# Patient Record
Sex: FEMALE
[De-identification: names, ages, dates, MRNs, and addresses within clinical notes are randomized per-mention and may not be internally consistent; named-entity substitution may affect disease eponyms.]

---

## 2017-01-05 ENCOUNTER — RX ONLY (OUTPATIENT)
Age: 62
Setting detail: RX ONLY
End: 2017-01-05

## 2018-02-08 ENCOUNTER — APPOINTMENT (RX ONLY)
Dept: URBAN - NONMETROPOLITAN AREA CLINIC 13 | Facility: CLINIC | Age: 63
Setting detail: DERMATOLOGY
End: 2018-02-08

## 2018-02-08 DIAGNOSIS — Z41.9 ENCOUNTER FOR PROCEDURE FOR PURPOSES OTHER THAN REMEDYING HEALTH STATE, UNSPECIFIED: ICD-10-CM

## 2018-02-08 PROCEDURE — ? JUVEDERM VOLUMA XC INJECTION

## 2018-02-08 PROCEDURE — ? BELOTERO INJECTION

## 2018-02-08 NOTE — PROCEDURE: BELOTERO INJECTION
Decollete Filler Volume In Cc: 0
Map Statement: See Attach Map for Complete Details
Price (Use Numbers Only, No Special Characters Or $): 900
Anesthesia Type: 0.2% lidocaine (mixed within filler)
Additional Area 1 Location: lip
Additional Anesthesia Volume In Cc: 6
Detail Level: Detailed
Additional Area 1 Volume In Cc: 1
Additional Area 2 Location: [facility]
Use Map Statement For Sites (Optional): No
Filler: Belotero
Lot #: [id number]
Expiration Date (Month Year): 11/18
Post-Care Instructions: Patient instructed to apply ice to reduce swelling.
Consent: Written consent obtained. Risks include but not limited to bruising, beading, irregular texture, ulceration, infection, allergic reaction, scar formation, incomplete augmentation, temporary nature, procedural pain.
Procedural Text: The filler was administered to the treatment areas noted above.

## 2018-02-08 NOTE — PROCEDURE: JUVEDERM VOLUMA XC INJECTION
Nasolabial Folds Filler Volume In Cc: 0
Consent: Written consent obtained. Risks include but not limited to bruising, beading, irregular texture, ulceration, infection, allergic reaction, scar formation, incomplete augmentation, temporary nature, procedural pain.
Anesthesia Volume In Cc: 0.5
Lot #: [id number]
Topical Anesthesia?: 23% lidocaine, 7% tetracaine
Use Map Statement For Sites (Optional): No
Additional Anesthesia Volume In Cc: 6
Expiration Date (Month Year): 4/23
Filler: Juvederm Voluma XC
Procedural Text: The filler was administered to the treatment areas noted above. Injected into cheek area.
Detail Level: Detailed
Cheeks Filler Volume In Cc: 2
Map Statment: See Attach Map for Complete Details
Post-Care Instructions: Patient instructed to apply ice to reduce swelling.
Price (Use Numbers Only, No Special Characters Or $): 1800

## 2018-02-16 ENCOUNTER — APPOINTMENT (RX ONLY)
Dept: URBAN - NONMETROPOLITAN AREA CLINIC 13 | Facility: CLINIC | Age: 63
Setting detail: DERMATOLOGY
End: 2018-02-16

## 2018-02-16 DIAGNOSIS — D18.0 HEMANGIOMA: ICD-10-CM

## 2018-02-16 DIAGNOSIS — L70.8 OTHER ACNE: ICD-10-CM

## 2018-02-16 DIAGNOSIS — I78.8 OTHER DISEASES OF CAPILLARIES: ICD-10-CM

## 2018-02-16 DIAGNOSIS — L56.4 POLYMORPHOUS LIGHT ERUPTION: ICD-10-CM | Status: RESOLVED

## 2018-02-16 DIAGNOSIS — L57.8 OTHER SKIN CHANGES DUE TO CHRONIC EXPOSURE TO NONIONIZING RADIATION: ICD-10-CM

## 2018-02-16 DIAGNOSIS — D22 MELANOCYTIC NEVI: ICD-10-CM

## 2018-02-16 PROBLEM — D22.5 MELANOCYTIC NEVI OF TRUNK: Status: ACTIVE | Noted: 2018-02-16

## 2018-02-16 PROBLEM — D18.01 HEMANGIOMA OF SKIN AND SUBCUTANEOUS TISSUE: Status: ACTIVE | Noted: 2018-02-16

## 2018-02-16 PROCEDURE — 99213 OFFICE O/P EST LOW 20 MIN: CPT

## 2018-02-16 PROCEDURE — ? SUNSCREEN RECOMMENDATIONS

## 2018-02-16 PROCEDURE — ? COUNSELING

## 2018-02-16 PROCEDURE — ? PRESCRIPTION

## 2018-02-16 RX ORDER — TRETINOIN 1 MG/G
CREAM TOPICAL
Qty: 1 | Refills: 3 | Status: ERX | COMMUNITY
Start: 2018-02-16

## 2018-02-16 RX ORDER — CLINDAMYCIN PHOSPHATE 10 MG/G
GEL TOPICAL
Qty: 1 | Refills: 3 | Status: ERX | COMMUNITY
Start: 2018-02-16

## 2018-02-16 RX ORDER — DAPSONE 75 MG/G
GEL TOPICAL
Qty: 1 | Refills: 3 | Status: ERX | COMMUNITY
Start: 2018-02-16

## 2018-02-16 RX ADMIN — DAPSONE: 75 GEL TOPICAL at 19:08

## 2018-02-16 RX ADMIN — CLINDAMYCIN PHOSPHATE: 10 GEL TOPICAL at 19:09

## 2018-02-16 RX ADMIN — TRETINOIN: 1 CREAM TOPICAL at 19:07

## 2018-02-16 ASSESSMENT — LOCATION SIMPLE DESCRIPTION DERM
LOCATION SIMPLE: RIGHT BACK
LOCATION SIMPLE: SUPERIOR FOREHEAD
LOCATION SIMPLE: CHEST
LOCATION SIMPLE: RIGHT UPPER BACK
LOCATION SIMPLE: LEFT NOSE

## 2018-02-16 ASSESSMENT — LOCATION ZONE DERM
LOCATION ZONE: FACE
LOCATION ZONE: TRUNK
LOCATION ZONE: NOSE

## 2018-02-16 ASSESSMENT — LOCATION DETAILED DESCRIPTION DERM
LOCATION DETAILED: LEFT NASAL SIDEWALL
LOCATION DETAILED: UPPER STERNUM
LOCATION DETAILED: RIGHT MID-UPPER BACK
LOCATION DETAILED: RIGHT SUPERIOR LATERAL UPPER BACK
LOCATION DETAILED: SUPERIOR MID FOREHEAD

## 2018-02-22 ENCOUNTER — RX ONLY (OUTPATIENT)
Age: 63
Setting detail: RX ONLY
End: 2018-02-22

## 2018-02-22 RX ORDER — CLINDAMYCIN PHOS/BENZOYL PEROX 1 %-5 %
GEL (GRAM) TOPICAL
Qty: 1 | Refills: 3 | Status: ERX | COMMUNITY
Start: 2018-02-22

## 2019-01-24 ENCOUNTER — RX ONLY (OUTPATIENT)
Age: 64
Setting detail: RX ONLY
End: 2019-01-24

## 2019-01-24 RX ORDER — DAPSONE 75 MG/G
GEL TOPICAL
Qty: 1 | Refills: 3 | Status: ERX

## 2020-01-09 ENCOUNTER — RX ONLY (OUTPATIENT)
Age: 65
Setting detail: RX ONLY
End: 2020-01-09

## 2020-01-09 RX ORDER — CLINDAMYCIN PHOSPHATE AND BENZOYL PEROXIDE 10; 50 MG/G; MG/G
GEL TOPICAL
Qty: 1 | Refills: 0 | Status: ERX | COMMUNITY
Start: 2020-01-09

## 2020-02-27 ENCOUNTER — APPOINTMENT (RX ONLY)
Dept: URBAN - METROPOLITAN AREA CLINIC 1 | Facility: CLINIC | Age: 65
Setting detail: DERMATOLOGY
End: 2020-02-27

## 2020-02-27 DIAGNOSIS — Z41.9 ENCOUNTER FOR PROCEDURE FOR PURPOSES OTHER THAN REMEDYING HEALTH STATE, UNSPECIFIED: ICD-10-CM

## 2020-02-27 PROCEDURE — ? JUVEDERM VOLBELLA INJECTION

## 2020-02-27 PROCEDURE — ? BOTOX

## 2020-02-27 NOTE — PROCEDURE: JUVEDERM VOLBELLA INJECTION
Decollete Filler Volume In Cc: 0
Include Cannula Information In Note?: No
Consent: Written consent obtained. Risks include but not limited to bruising, beading, irregular texture, ulceration, infection, allergic reaction, scar formation, incomplete augmentation, temporary nature, procedural pain.
Additional Area 4 Location: Vermillion border, lower lip and perioral rhytides
Additional Area 2 Location: Scar on L side of face
Lot #: [id number]
Expiration Date (Month Year): 9/20
Anesthesia Type: 1% lidocaine with epinephrine
Filler: Juvederm Volbella XC
Post-Care Instructions: Patient instructed to apply ice to reduce swelling.
Map Statment: See Attach Map for Complete Details
Price (Use Numbers Only, No Special Characters Or $): 737
Additional Anesthesia Volume In Cc: 6
Vermilion Lips Filler Volume In Cc: 0.8
Detail Level: Detailed
Additional Area 1 Location: perioral rhytides
Additional Area 3 Location: corners of mouth
Additional Area 5 Location: touch up to lip filler
Procedural Text: The filler was administered to the treatment areas noted above.
Anesthesia Volume In Cc: 0.5
Additional Area 3 Volume In Cc: 0.3
Number Of Syringes (Required For Inventory): 1

## 2020-02-27 NOTE — PROCEDURE: BOTOX
Show Additional Area 5: Yes
Periorbital Skin Units: 0
Post-Care Instructions: Patient instructed to not lie down for 4 hours and limit physical activity for 24 hours. Patient instructed not to travel by airplane for 48 hours.
Show Right And Left Pupillary Line Units: No
Lot #: ,
Detail Level: Zone
Expiration Date (Month Year): 8/22, 7/22
Additional Area 1 Units: 20
Additional Area 5 Location: L sergey
Dilution (U/0.1 Cc): 1
Price (Use Numbers Only, No Special Characters Or $): 497
Additional Area 4 Location: sergey [facility]
Additional Area 2 Location: masseter, L and R
Additional Area 1 Location: [facility].
Additional Area 2 Units: 30
Additional Area 6 Location: platysma
Consent: Written consent obtained. Risks include but not limited to lid/brow ptosis, bruising, swelling, diplopia, temporary effect, incomplete chemical denervation.
Additional Area 3 Location: R high forehead

## 2020-03-11 ENCOUNTER — APPOINTMENT (RX ONLY)
Dept: URBAN - METROPOLITAN AREA CLINIC 1 | Facility: CLINIC | Age: 65
Setting detail: DERMATOLOGY
End: 2020-03-11

## 2020-03-11 DIAGNOSIS — Z41.9 ENCOUNTER FOR PROCEDURE FOR PURPOSES OTHER THAN REMEDYING HEALTH STATE, UNSPECIFIED: ICD-10-CM

## 2020-03-11 PROCEDURE — ? SCULPTRA

## 2020-03-11 NOTE — PROCEDURE: SCULPTRA
Left Middle Malar Filler Volume In Cc: 0
Treatment Number: 1
Show First Additional Location?: Yes
Additional Area 1 Location: melolabial fold
Lateral Face Sculptra Filler Volume In Cc: 18
Post-Care Instructions: Patient instructed to massage area vigorously fir 5 minutes 5 times a day for 5 days. Also instructed if she has any concerns.
Show Right And Left Buccal Volume?: No
Cheeks Filler Volume In Cc: 8
Lot #: [id number]
Temple Hollows Filler Volume In Cc: 10
Detail Level: Detailed
Jawline Sculptra Filler Volume In Cc: 2
Expiration Date (Month Year): 10/22
Anesthesia Type: 1% lidocaine with 1:100,000 epinephrine
Price (Use Numbers Only, No Special Characters Or $): 1600
Additional Area 2 Location: midface rhytides
Dilution Method: The Sculptra was diluted with 10ml Bacteriostatic water and 9cc NS,1cc Lidocaine.
Additional Area 3 Location: jawline and lower face
Injection Technique: The Sculptra was injected with cannula to the listed areas after cleansing the skin with Chloroxylenol and Hypochlorus.
Volumizer: Sculptra
Consent: Written consent obtained. Risks include but not limited to bruising, beading, irregular texture, ulceration, infection, allergic reaction, scar formation, incomplete augmentation, temporary nature, procedural pain.
Additional Anesthesia Type: 1% lidocaine without epinephrine
Anesthesia Volume In Cc: 0.2
Map Statement: See Attached Map for Complete Details.

## 2020-03-16 ENCOUNTER — RX ONLY (OUTPATIENT)
Age: 65
Setting detail: RX ONLY
End: 2020-03-16

## 2020-03-16 RX ORDER — BIMATOPROST 0.3 MG/ML
SOLUTION/ DROPS OPHTHALMIC
Qty: 1 | Refills: 12 | COMMUNITY
Start: 2020-03-16

## 2020-06-09 ENCOUNTER — APPOINTMENT (RX ONLY)
Dept: URBAN - NONMETROPOLITAN AREA CLINIC 13 | Facility: CLINIC | Age: 65
Setting detail: DERMATOLOGY
End: 2020-06-09

## 2020-06-09 DIAGNOSIS — Z41.9 ENCOUNTER FOR PROCEDURE FOR PURPOSES OTHER THAN REMEDYING HEALTH STATE, UNSPECIFIED: ICD-10-CM

## 2020-06-09 PROCEDURE — ? SCULPTRA

## 2020-06-09 NOTE — PROCEDURE: SCULPTRA
Chin Filler Volume In Cc: 0
Cheeks Filler Volume In Cc: 6
Jawline Sculptra Filler Volume In Cc: 4
Anesthesia Volume In Cc: 0.2
Post-Care Instructions: Patient instructed to massage area vigorously fir 5 minutes 5 times a day for 5 days. Also instructed if she has any concerns.
Show Third Additional Location?: Yes
Price (Use Numbers Only, No Special Characters Or $): 1600
Show Right And Left Nasolabial Fold Volume?: No
Additional Area 2 Location: midface rhytides
Dilution Method: The Sculptra was diluted with 10ml Bacteriostatic water and 9cc NS,1cc Lidocaine.
Additional Area 3 Location: jawline and lower face
Additional Anesthesia Type: 1% lidocaine without epinephrine
Volumizer: Sculptra
Injection Technique: The Sculptra was injected with cannula to the listed areas after cleansing the skin with Chloroxylenol and Hypochlorus.
Consent: Written consent obtained. Risks include but not limited to bruising, beading, irregular texture, ulceration, infection, allergic reaction, scar formation, incomplete augmentation, temporary nature, procedural pain.
Lot #: [id number]
Additional Anesthesia Volume In Cc: 1
Map Statement: See Attached Map for Complete Details.
Temple Hollows Filler Volume In Cc: 10
Additional Area 1 Location: melolabial fold
Anesthesia Type: 1% lidocaine with 1:100,000 epinephrine
Vials Reconstituted (Required For Inventory): 2
Additional Area 4 Location: lower face,chin area
Detail Level: Detailed
Expiration Date (Month Year): 11/22

## 2020-10-21 ENCOUNTER — APPOINTMENT (RX ONLY)
Dept: URBAN - NONMETROPOLITAN AREA CLINIC 13 | Facility: CLINIC | Age: 65
Setting detail: DERMATOLOGY
End: 2020-10-21

## 2020-10-21 DIAGNOSIS — L82.0 INFLAMED SEBORRHEIC KERATOSIS: ICD-10-CM

## 2020-10-21 PROBLEM — D48.5 NEOPLASM OF UNCERTAIN BEHAVIOR OF SKIN: Status: ACTIVE | Noted: 2020-10-21

## 2020-10-21 PROCEDURE — ? COUNSELING

## 2020-10-21 PROCEDURE — 99212 OFFICE O/P EST SF 10 MIN: CPT

## 2020-10-21 ASSESSMENT — LOCATION ZONE DERM: LOCATION ZONE: TRUNK

## 2020-10-21 ASSESSMENT — LOCATION SIMPLE DESCRIPTION DERM: LOCATION SIMPLE: CHEST

## 2020-10-21 ASSESSMENT — LOCATION DETAILED DESCRIPTION DERM: LOCATION DETAILED: UPPER STERNUM

## 2021-11-09 ENCOUNTER — APPOINTMENT (RX ONLY)
Dept: URBAN - NONMETROPOLITAN AREA CLINIC 13 | Facility: CLINIC | Age: 66
Setting detail: DERMATOLOGY
End: 2021-11-09

## 2021-11-09 DIAGNOSIS — Z41.9 ENCOUNTER FOR PROCEDURE FOR PURPOSES OTHER THAN REMEDYING HEALTH STATE, UNSPECIFIED: ICD-10-CM

## 2021-11-09 PROCEDURE — ? BOTOX

## 2021-11-09 PROCEDURE — ? JUVEDERM VOLUMA XC INJECTION

## 2021-11-09 PROCEDURE — ? RESTYLANE REFYNE INJECTION

## 2021-11-09 PROCEDURE — ? JUVEDERM ULTRA INJECTION

## 2021-11-09 PROCEDURE — ? SCITON BBL SKINTYTE

## 2021-11-09 NOTE — PROCEDURE: SCITON BBL SKINTYTE
Temp In C (Optional): 20
Time In Seconds (Optional): 15
Shots (Optional): 2 passes
Post-Care Instructions: I reviewed with the patient in detail post-care instructions. Patient should stay away from the sun and wear sun protection until treated areas are fully healed.
Preprocedure Text: The treatment areas were thoroughly cleaned.
Intesity In W/Cm2 (Optional): 10
Expected Treatment Number: 0
Price (Use Numbers Only, No Special Characters Or $): 500
Intesity In W/Cm2 (Optional): 12
Post Procedure Text: The patient tolerated the procedure well. Post care was reviewed with the patient.
Shots (Optional): 4 passes
Treatment Number: 1
Consent: Written consent obtained, risks reviewed including but not limited to crusting, scabbing, blistering, scarring, darker or lighter pigmentary change, bruising, and/or incomplete response.
Location Override (Will Not Show Above If Text Entered): lower face,neck
Intesity In W/Cm2 (Optional): 8-9
Shots (Optional): 3passes
Location Override (Will Not Show Above If Text Entered): face,neck
Detail Level: Zone
Intesity In W/Cm2 (Optional): 9-10

## 2021-11-09 NOTE — PROCEDURE: RESTYLANE REFYNE INJECTION
Map Statement: See Attach Map for Complete Details
Filler: Restylane Refyne
Consent: Written consent obtained. Risks include but not limited to bruising, beading, irregular texture, ulceration, infection, allergic reaction, scar formation, incomplete augmentation, temporary nature, procedural pain.
Additional Area 5 Location: chin,
Include Cannula Information In Note?: No
Temple Hollows Filler Volume In Cc: 0
Expiration Date (Month Year): 08/22
Price (Use Numbers Only, No Special Characters Or $): 801
Additional Area 3 Location: corner of mouth
Additional Area 1 Location: lower face
Nasolabial Folds Filler Volume In Cc: 0.2
Detail Level: Detailed
Additional Anesthesia Volume In Cc: 6
Number Of Syringes (Required For Inventory): 1
Lot #: [id number]
Additional Area 4 Location: ermillion and perioral rhytides
Post-Care Instructions: Patient instructed to apply ice to reduce swelling.
Anesthesia Volume In Cc: 0.5
Additional Area 2 Location: perioral
Anesthesia Type: 1% lidocaine with epinephrine
Marionette Lines Filler Volume In Cc: 0.8
Procedural Text: The filler was administered to the treatment areas noted above.

## 2021-11-09 NOTE — PROCEDURE: JUVEDERM VOLUMA XC INJECTION
Price (Use Numbers Only, No Special Characters Or $): 1800
Map Statment: See Attach Map for Complete Details
Tear Troughs Filler Volume In Cc: 0
Include Cannula Size?: 25G
Consent: Written consent obtained. Risks include but not limited to bruising, beading, irregular texture, ulceration, infection, allergic reaction, scar formation, incomplete augmentation, temporary nature, procedural pain.
Cheeks Filler Volume In Cc: 1
Post-Care Instructions: Patient instructed to apply ice to reduce swelling.
Lot #: [id number]
Additional Area 4 Location: lower face rhytides
Filler: Juvederm Voluma XC
Additional Area 2 Location: chin
Anesthesia Volume In Cc: 0.5
Use Map Statement For Sites (Optional): No
Procedural Text: The filler was administered to the treatment areas noted above. .\\n.
Include Cannula Length?: 1 inch
Detail Level: Detailed
Expiration Date (Month Year): 8/22
Include Cannula Information In Note?: Yes
Additional Area 3 Location: Left cheek touch up

## 2021-11-09 NOTE — PROCEDURE: BOTOX
Post-Care Instructions: Patient instructed to not lie down for 4 hours and limit physical activity for 24 hours. Patient instructed not to travel by airplane for 48 hours.
Anterior Platysmal Bands Units: 0
Lot #: 
Show Additional Area 5: Yes
Additional Area 2 Location: upper lip
Show Right And Left Pupillary Line Units: No
Inferior Lateral Orbicularis Oculi Units: 20
Expiration Date (Month Year): 4/24
Additional Area 4 Location: corners of nose for gummy smile
Dilution (U/0.1 Cc): 1
Additional Area 1 Location: brow,
Additional Area 6 Location: chin
Detail Level: Zone
Additional Area 5 Location: forhead R side 1 unit.
Additional Area 3 Location: left lateral eye 3 Right 4
Consent: Written consent obtained. Risks include but not limited to lid/brow ptosis, bruising, swelling, diplopia, temporary effect, incomplete chemical denervation.

## 2021-11-09 NOTE — PROCEDURE: JUVEDERM ULTRA INJECTION
Cheeks Filler Volume In Cc: 0
Additional Area 4 Location: chin
Detail Level: Detailed
Use Map Statement For Sites (Optional): No
Post-Care Instructions: Patient instructed to apply ice to reduce swelling.
Procedural Text: The filler was administered to the treatment areas noted above.
Anesthesia Type: 1% lidocaine with epinephrine
Additional Area 2 Location: lower lip
Lot #: [id number]
Price (Use Numbers Only, No Special Characters Or $): 893
Filler: Juvederm Ultra
Anesthesia Volume In Cc: 0.5
Number Of Syringes (Required For Inventory): 1
Consent: Written consent obtained. Risks include but not limited to bruising, beading, irregular texture, ulceration, infection, allergic reaction, scar formation, incomplete augmentation, temporary nature, procedural pain.
Additional Area 3 Location: lower face  rhytides
Additional Anesthesia Volume In Cc: 6
Additional Area 1 Location: corner of mouth
Map Statement: See Attach Map for Complete Details
Additional Area 5 Location: lips, upper
Expiration Date (Month Year): 4/22

## 2021-11-16 ENCOUNTER — RX ONLY (OUTPATIENT)
Age: 66
Setting detail: RX ONLY
End: 2021-11-16

## 2021-11-16 RX ORDER — DAPSONE 75 MG/G
AAA GEL TOPICAL QD
Qty: 60 | Refills: 2 | Status: ERX | COMMUNITY
Start: 2021-11-16

## 2021-11-16 RX ORDER — CLINDAMYCIN PHOSPHATE AND BENZOYL PEROXIDE 10; 50 MG/G; MG/G
AAA GEL TOPICAL QD
Qty: 25 | Refills: 2 | Status: ERX | COMMUNITY
Start: 2021-11-16

## 2021-11-17 ENCOUNTER — RX ONLY (OUTPATIENT)
Age: 66
Setting detail: RX ONLY
End: 2021-11-17

## 2021-11-17 RX ORDER — TRETIONIN 1 MG/G
AAA CREAM TOPICAL QHS
Qty: 45 | Refills: 2 | Status: ERX | COMMUNITY
Start: 2021-11-17

## 2021-12-15 ENCOUNTER — RX ONLY (OUTPATIENT)
Age: 66
Setting detail: RX ONLY
End: 2021-12-15

## 2021-12-15 RX ORDER — DAPSONE 50 MG/G
AAA GEL TOPICAL QD
Qty: 60 | Refills: 3 | Status: ERX | COMMUNITY
Start: 2021-12-15

## 2022-03-23 ENCOUNTER — APPOINTMENT (RX ONLY)
Dept: URBAN - NONMETROPOLITAN AREA CLINIC 13 | Facility: CLINIC | Age: 67
Setting detail: DERMATOLOGY
End: 2022-03-23

## 2022-03-23 DIAGNOSIS — Z41.9 ENCOUNTER FOR PROCEDURE FOR PURPOSES OTHER THAN REMEDYING HEALTH STATE, UNSPECIFIED: ICD-10-CM

## 2022-03-23 PROCEDURE — ? SCITON BBL SKINTYTE

## 2022-03-23 NOTE — PROCEDURE: SCITON BBL SKINTYTE
Shots (Optional): 3 passes
Shots (Optional): 3passes
Intesity In W/Cm2 (Optional): 8-9
Time In Seconds (Optional): 15
Post Procedure Text: The patient tolerated the procedure well. Post care was reviewed with the patient.
Location Override (Will Not Show Above If Text Entered): lower face,neck
Intesity In W/Cm2 (Optional): 12
Treatment Number: 1
Price (Use Numbers Only, No Special Characters Or $): 500
Location Override (Will Not Show Above If Text Entered): face,neck
Expected Treatment Number: 0
Time In Seconds (Optional): 13
Consent: Written consent obtained, risks reviewed including but not limited to crusting, scabbing, blistering, scarring, darker or lighter pigmentary change, bruising, and/or incomplete response.
Temp In C (Optional): 20
Intesity In W/Cm2 (Optional): 10
Detail Level: Zone
Preprocedure Text: The treatment areas were thoroughly cleaned.
Post-Care Instructions: I reviewed with the patient in detail post-care instructions. Patient should stay away from the sun and wear sun protection until treated areas are fully healed.

## 2022-05-19 ENCOUNTER — APPOINTMENT (RX ONLY)
Dept: URBAN - NONMETROPOLITAN AREA CLINIC 13 | Facility: CLINIC | Age: 67
Setting detail: DERMATOLOGY
End: 2022-05-19

## 2022-05-19 DIAGNOSIS — Z41.9 ENCOUNTER FOR PROCEDURE FOR PURPOSES OTHER THAN REMEDYING HEALTH STATE, UNSPECIFIED: ICD-10-CM

## 2022-05-19 PROCEDURE — ? PULSED-DYE LASER

## 2022-05-19 NOTE — PROCEDURE: PULSED-DYE LASER
Location Override: lower face, acne
Spot Size: 5 mm
Pulse Duration: 10 ms
Post-Care Instructions: I reviewed with the patient in detail post-care instructions. Patient should stay away from the sun and wear sun protection until treated areas are fully healed.
Cryogen Time (Ms): 30
Delay Time (Ms): 20
Detail Level: Zone
Treated Area: medium area
Spot Size: 7 mm
Post-Procedure Care: Post care reviewed with patient.
Cryogen Time (Ms): 0
Pulse Count (Location 2): x2
Laser Type: Vbeam 595nm
Price (Use Numbers Only, No Special Characters Or $): 300
Consent: Written consent obtained, risks reviewed including but not limited to crusting, scabbing, blistering, scarring, darker or lighter pigmentary change, incidental hair removal, bruising, and/or incomplete removal.
Spot Size: 10 mm
Fluence In J/Cm2 (Optional): 10
Location Override: lower face
Fluence In J/Cm2 (Optional): 7.5
Immediate Endpoint: erythema
Immediate Endpoint: purpura

## 2022-06-09 ENCOUNTER — APPOINTMENT (RX ONLY)
Dept: URBAN - NONMETROPOLITAN AREA CLINIC 13 | Facility: CLINIC | Age: 67
Setting detail: DERMATOLOGY
End: 2022-06-09

## 2022-06-09 DIAGNOSIS — Z41.9 ENCOUNTER FOR PROCEDURE FOR PURPOSES OTHER THAN REMEDYING HEALTH STATE, UNSPECIFIED: ICD-10-CM

## 2022-06-09 PROCEDURE — ? PULSED-DYE LASER

## 2022-06-09 NOTE — PROCEDURE: PULSED-DYE LASER
Spot Size: 10 mm
Delay Time (Ms): 20
Pulse Duration: 10 ms
Spot Size: 5 mm
Fluence In J/Cm2 (Optional): 7
Cryogen Time (Ms): 30
Spot Size: 7 mm
Fluence In J/Cm2 (Optional): 10
Detail Level: Zone
Post-Care Instructions: I reviewed with the patient in detail post-care instructions. Patient should stay away from the sun and wear sun protection until treated areas are fully healed.
Location Override: nose
Delay Time (Ms): 0
Immediate Endpoint: erythema
Pulse Count (Location 2): x2
Post-Procedure Care: Post care reviewed with patient.
Consent: Written consent obtained, risks reviewed including but not limited to crusting, scabbing, blistering, scarring, darker or lighter pigmentary change, incidental hair removal, bruising, and/or incomplete removal.
Location Override: lower face, acne
Treated Area: small area
Laser Type: Vbeam 595nm
Price (Use Numbers Only, No Special Characters Or $): 200

## 2022-09-23 ENCOUNTER — APPOINTMENT (RX ONLY)
Dept: URBAN - NONMETROPOLITAN AREA CLINIC 13 | Facility: CLINIC | Age: 67
Setting detail: DERMATOLOGY
End: 2022-09-23

## 2022-09-23 DIAGNOSIS — Z41.9 ENCOUNTER FOR PROCEDURE FOR PURPOSES OTHER THAN REMEDYING HEALTH STATE, UNSPECIFIED: ICD-10-CM

## 2022-09-23 PROCEDURE — ? LASER HAIR REMOVAL

## 2022-09-23 NOTE — PROCEDURE: LASER HAIR REMOVAL
Treatment Number: 1
Detail Level: Detailed
External Cooling Fan Speed: 0
Fluence (Will Not Render If 0): 22
Fluence (Will Not Render If 0): 20
Cooling: DCD setting
Spot Size: 12 mm
Eye Shield Text: Given the treatment area eye shields were inserted prior to treatment.
Pulse Duration (Include Units): 12
Price (Use Numbers Only, No Special Characters Or $): 100
Fluence (Will Not Render If 0): 19
Consent: Written consent obtained, risks reviewed including but not limited to crusting, scabbing, blistering, scarring, darker or lighter pigmentary change, paradoxical hair regrowth, incomplete removal of hair and infection.
Pre-Procedure: Prior to proceeding the treatment areas were cleaned and all present put on their eye protection.
Tolerated Procedure (Optional): Tolerated Well
Spot Size: 18 mm
Post-Care Instructions: I reviewed with the patient in detail post-care instructions. Patient should avoid sun for a minimum of 4 weeks before and after treatment.
Post-Procedure Care: Immediate endpoint: perifollicular erythema and edema. Post care reviewed with patient.
Shaving (Optional): The patient shaved at home
Render Post-Care In The Note: No
Cooling Override: 20/10/0
Laser Type: Nd:Yag 1064nm
Fluence (Will Not Render If 0): 15
Spot Size: 1.5 mm

## 2022-10-04 ENCOUNTER — APPOINTMENT (RX ONLY)
Dept: URBAN - NONMETROPOLITAN AREA CLINIC 13 | Facility: CLINIC | Age: 67
Setting detail: DERMATOLOGY
End: 2022-10-04

## 2022-10-04 DIAGNOSIS — Z41.9 ENCOUNTER FOR PROCEDURE FOR PURPOSES OTHER THAN REMEDYING HEALTH STATE, UNSPECIFIED: ICD-10-CM

## 2022-10-04 PROCEDURE — ? LASER HAIR REMOVAL

## 2022-10-04 NOTE — PROCEDURE: LASER HAIR REMOVAL
Fluence (Will Not Render If 0): 20
Were Eye Shields Employed?: No
Cooling: DCD setting
Spot Size: 12 mm
Number Of Prepaid Treatments (Will Not Render If 0): 0
Detail Level: Detailed
Fluence (Will Not Render If 0): 18
Consent: Written consent obtained, risks reviewed including but not limited to crusting, scabbing, blistering, scarring, darker or lighter pigmentary change, paradoxical hair regrowth, incomplete removal of hair and infection.
Shaving (Optional): The patient shaved at home
Eye Shield Text: Given the treatment area eye shields were inserted prior to treatment.
Pre-Procedure: Prior to proceeding the treatment areas were cleaned and all present put on their eye protection.
Post-Care Instructions: I reviewed with the patient in detail post-care instructions. Patient should avoid sun for a minimum of 4 weeks before and after treatment.
Spot Size: 1.5 mm
Pulse Duration (Include Units): 5
Cooling Override: 20/10
Location Override: went over the area 2 times with gel and then spot treated areas of concern without gel
Fluence (Will Not Render If 0): 22
Post-Procedure Care: Immediate endpoint: perifollicular erythema and edema. Post care reviewed with patient.
Price (Use Numbers Only, No Special Characters Or $): 0.00
Tolerated Procedure (Optional): 10 out of 10
Laser Type: Alexandrite 755nm